# Patient Record
Sex: MALE | Race: WHITE | Employment: FULL TIME | URBAN - METROPOLITAN AREA
[De-identification: names, ages, dates, MRNs, and addresses within clinical notes are randomized per-mention and may not be internally consistent; named-entity substitution may affect disease eponyms.]

---

## 2024-10-25 ENCOUNTER — OFFICE VISIT (OUTPATIENT)
Age: 34
End: 2024-10-25
Payer: COMMERCIAL

## 2024-10-25 VITALS
DIASTOLIC BLOOD PRESSURE: 88 MMHG | HEART RATE: 76 BPM | WEIGHT: 225 LBS | HEIGHT: 72 IN | BODY MASS INDEX: 30.48 KG/M2 | OXYGEN SATURATION: 98 % | SYSTOLIC BLOOD PRESSURE: 136 MMHG | TEMPERATURE: 98.3 F

## 2024-10-25 DIAGNOSIS — R06.83 SNORING: ICD-10-CM

## 2024-10-25 DIAGNOSIS — K21.9 GASTROESOPHAGEAL REFLUX DISEASE, UNSPECIFIED WHETHER ESOPHAGITIS PRESENT: ICD-10-CM

## 2024-10-25 DIAGNOSIS — R13.14 PHARYNGOESOPHAGEAL DYSPHAGIA: Primary | ICD-10-CM

## 2024-10-25 DIAGNOSIS — G47.10 HYPERSOMNOLENCE: ICD-10-CM

## 2024-10-25 PROCEDURE — 99214 OFFICE O/P EST MOD 30 MIN: CPT | Performed by: FAMILY MEDICINE

## 2024-10-25 RX ORDER — PANTOPRAZOLE SODIUM 40 MG/1
40 TABLET, DELAYED RELEASE ORAL 2 TIMES DAILY
Qty: 60 TABLET | Refills: 1 | Status: SHIPPED | OUTPATIENT
Start: 2024-10-25

## 2024-10-25 SDOH — ECONOMIC STABILITY: FOOD INSECURITY: WITHIN THE PAST 12 MONTHS, THE FOOD YOU BOUGHT JUST DIDN'T LAST AND YOU DIDN'T HAVE MONEY TO GET MORE.: NEVER TRUE

## 2024-10-25 SDOH — ECONOMIC STABILITY: INCOME INSECURITY: HOW HARD IS IT FOR YOU TO PAY FOR THE VERY BASICS LIKE FOOD, HOUSING, MEDICAL CARE, AND HEATING?: NOT HARD AT ALL

## 2024-10-25 SDOH — ECONOMIC STABILITY: FOOD INSECURITY: WITHIN THE PAST 12 MONTHS, YOU WORRIED THAT YOUR FOOD WOULD RUN OUT BEFORE YOU GOT MONEY TO BUY MORE.: NEVER TRUE

## 2024-10-25 ASSESSMENT — PATIENT HEALTH QUESTIONNAIRE - PHQ9
SUM OF ALL RESPONSES TO PHQ QUESTIONS 1-9: 0
2. FEELING DOWN, DEPRESSED OR HOPELESS: NOT AT ALL
SUM OF ALL RESPONSES TO PHQ QUESTIONS 1-9: 0
SUM OF ALL RESPONSES TO PHQ9 QUESTIONS 1 & 2: 0
SUM OF ALL RESPONSES TO PHQ QUESTIONS 1-9: 0
1. LITTLE INTEREST OR PLEASURE IN DOING THINGS: NOT AT ALL
SUM OF ALL RESPONSES TO PHQ QUESTIONS 1-9: 0

## 2024-10-25 NOTE — PROGRESS NOTES
[facility]  [facility] MEDICINE  [address]  [place] [address]     Date of Visit:  10/25/2024  Patient Name: Nilesh Lorenzo   Patient :  1990     CHIEF COMPLAINT/HPI:     Chief Complaint   Patient presents with    Gastroesophageal Reflux     Patient is having some stomach issues, he states he's having some acid reflex and bloating and says it has been going on for about 2 weeks         HPI      Nilesh Lorenzo, 34 y.o. presents today for acute issue.    He has been having pain in esophagus from his throat down to his stomach.  He was previously on omeprazole.  He ran out of prescription and has been using OTC generic for the last 3-4 weeks.  Despite this, he has been feeling the acid come up and burning his throat after he eats.  He has done CT scan in the past.  Bowel movements have been fine.  He has never had an EGD.  He does drink coffee in the morning, 24 ounces in the morning.  He is drinking about 40 ounces of soda in the evening.  Occasionally drinks beer on the weekend.  Sometimes it is hard to swallow, like pancakes or bread.      He has been under a lot of stress lately.  His brother  3-4 weeks ago.      His wife is complaining that he is snoring loudly at nighttime.  He is waking up with tension headaches and not feeling rested.      REVIEW OF SYSTEM      Review of Systems:   Constitutional:  Negative for chills, fatigue, fever and unexpected weight change.   Eyes:  Negative for visual disturbance.   Respiratory:  Negative for cough, chest tightness, shortness of breath and wheezing.    Cardiovascular:  Negative for chest pain, palpitations and leg swelling.     REVIEWED INFORMATION      No Known Allergies    Current Outpatient Medications   Medication Sig Dispense Refill    pantoprazole (PROTONIX) 40 MG tablet Take 1 tablet by mouth in the morning and at bedtime 60 tablet 1     No current facility-administered medications for this visit.        There is no

## 2024-10-25 NOTE — PATIENT INSTRUCTIONS
Nilesh    Thank you for choosing [facility].  We know you have options when it comes to your healthcare; we appreciate that you chose us. Our goal is to provide exceptional service and world class care to every patient.  You will be receiving a survey via email or text message asking for your feedback.  Please take a few minutes to share your thoughts about your recent visit. Your comments helps us understand what we do well and ways we can improve.    We thank you in advance for your valuable feedback      Dr. Rowland  and ZURI Mora

## 2025-01-03 DIAGNOSIS — R13.14 PHARYNGOESOPHAGEAL DYSPHAGIA: ICD-10-CM

## 2025-01-07 ENCOUNTER — OFFICE VISIT (OUTPATIENT)
Age: 35
End: 2025-01-07
Payer: COMMERCIAL

## 2025-01-07 VITALS
BODY MASS INDEX: 30.38 KG/M2 | DIASTOLIC BLOOD PRESSURE: 84 MMHG | SYSTOLIC BLOOD PRESSURE: 132 MMHG | OXYGEN SATURATION: 97 % | TEMPERATURE: 98.7 F | HEART RATE: 77 BPM | WEIGHT: 224 LBS

## 2025-01-07 DIAGNOSIS — G47.10 HYPERSOMNOLENCE: ICD-10-CM

## 2025-01-07 DIAGNOSIS — K21.9 GASTROESOPHAGEAL REFLUX DISEASE, UNSPECIFIED WHETHER ESOPHAGITIS PRESENT: Primary | ICD-10-CM

## 2025-01-07 DIAGNOSIS — R13.14 PHARYNGOESOPHAGEAL DYSPHAGIA: ICD-10-CM

## 2025-01-07 DIAGNOSIS — R06.83 SNORING: ICD-10-CM

## 2025-01-07 PROCEDURE — 99214 OFFICE O/P EST MOD 30 MIN: CPT | Performed by: FAMILY MEDICINE

## 2025-01-07 RX ORDER — PANTOPRAZOLE SODIUM 40 MG/1
40 TABLET, DELAYED RELEASE ORAL 2 TIMES DAILY
Qty: 60 TABLET | Refills: 1 | Status: SHIPPED | OUTPATIENT
Start: 2025-01-07

## 2025-01-07 ASSESSMENT — PATIENT HEALTH QUESTIONNAIRE - PHQ9
SUM OF ALL RESPONSES TO PHQ QUESTIONS 1-9: 0
SUM OF ALL RESPONSES TO PHQ QUESTIONS 1-9: 0
2. FEELING DOWN, DEPRESSED OR HOPELESS: NOT AT ALL
SUM OF ALL RESPONSES TO PHQ QUESTIONS 1-9: 0
SUM OF ALL RESPONSES TO PHQ QUESTIONS 1-9: 0
SUM OF ALL RESPONSES TO PHQ9 QUESTIONS 1 & 2: 0
1. LITTLE INTEREST OR PLEASURE IN DOING THINGS: NOT AT ALL

## 2025-01-07 NOTE — PROGRESS NOTES
Nilesh Lorenzo (:  1990) is a 34 y.o. male, Established patient, here for evaluation of the following chief complaint(s):  Abnormal Test Results (Had EGD on 24 with Dr. Cash, awaiting biopsy results.  Needs sleep study and another EGD done in the hospital, per Dr. Cash. (Sleep apnea under anesthesia))         Assessment & Plan  1. Suspicion for Godinez's esophagus.  The diagnosis of Godinez's esophagus is based on the observed redness and other visual indicators, pending biopsy confirmation. The sore throat is likely a result of acid reflux. Medications such as Protonix can help halt or slow its progression. The potential long-term risks associated with Protonix, including an increased risk of kidney disease and dementia, have been discussed. However, given his condition, the benefits of the medication outweigh these risks. He has been advised to maintain regular follow-ups with Dr. Cash or his associates to ensure timely polyp scopes and other necessary procedures. He has been reassured that he can live with Godinez's esophagus, provided it does not progress to dysplasia or carcinoma. He is currently on the highest dose of Protonix, which will eventually be reduced to 40 mg once daily. He has been advised to continue his lifestyle modifications, including dietary changes and quitting chewing tobacco. He has been encouraged to consume small, frequent meals throughout the day to avoid nighttime reflux. He has been advised to follow a low-citrus diet for a while and then gradually reintroduce other foods to assess their impact.    2. Hiatal hernia.  The hiatal hernia measures 4 cm, indicating that a portion of the stomach has protruded through the diaphragm. He has been informed that surgical intervention is typically reserved for large hernias that cause significant symptoms such as shortness of breath. He has been advised to monitor his symptoms and report any changes.    3. Gastroesophageal

## 2025-01-17 ENCOUNTER — HOSPITAL ENCOUNTER (OUTPATIENT)
Dept: SLEEP CENTER | Age: 35
Discharge: HOME | End: 2025-01-19
Attending: FAMILY MEDICINE
Payer: COMMERCIAL

## 2025-01-17 VITALS
WEIGHT: 224 LBS | HEIGHT: 72 IN | RESPIRATION RATE: 18 BRPM | OXYGEN SATURATION: 96 % | HEART RATE: 69 BPM | BODY MASS INDEX: 30.34 KG/M2

## 2025-01-17 DIAGNOSIS — G47.10 HYPERSOMNOLENCE: ICD-10-CM

## 2025-01-17 DIAGNOSIS — R06.83 SNORING: ICD-10-CM

## 2025-01-17 PROCEDURE — 95810 POLYSOM 6/> YRS 4/> PARAM: CPT

## 2025-01-17 ASSESSMENT — SLEEP AND FATIGUE QUESTIONNAIRES
HOW LIKELY ARE YOU TO NOD OFF OR FALL ASLEEP WHILE SITTING QUIETLY AFTER LUNCH WITHOUT ALCOHOL: WOULD NEVER DOZE
HOW LIKELY ARE YOU TO NOD OFF OR FALL ASLEEP WHILE LYING DOWN TO REST IN THE AFTERNOON WHEN CIRCUMSTANCES PERMIT: HIGH CHANCE OF DOZING
HOW LIKELY ARE YOU TO NOD OFF OR FALL ASLEEP WHILE SITTING INACTIVE IN A PUBLIC PLACE: WOULD NEVER DOZE
ESS TOTAL SCORE: 7
HOW LIKELY ARE YOU TO NOD OFF OR FALL ASLEEP WHEN YOU ARE A PASSENGER IN A CAR FOR AN HOUR WITHOUT A BREAK: SLIGHT CHANCE OF DOZING
HOW LIKELY ARE YOU TO NOD OFF OR FALL ASLEEP WHILE WATCHING TV: HIGH CHANCE OF DOZING
HOW LIKELY ARE YOU TO NOD OFF OR FALL ASLEEP WHILE SITTING AND TALKING TO SOMEONE: WOULD NEVER DOZE
HOW LIKELY ARE YOU TO NOD OFF OR FALL ASLEEP WHILE SITTING AND READING: WOULD NEVER DOZE
HOW LIKELY ARE YOU TO NOD OFF OR FALL ASLEEP IN A CAR, WHILE STOPPED FOR A FEW MINUTES IN TRAFFIC: WOULD NEVER DOZE

## 2025-02-14 DIAGNOSIS — R06.83 SNORING: Primary | ICD-10-CM

## 2025-02-14 DIAGNOSIS — G47.10 HYPERSOMNOLENCE: ICD-10-CM

## 2025-02-19 ENCOUNTER — ANESTHESIA (OUTPATIENT)
Dept: OPERATING ROOM | Age: 35
End: 2025-02-19
Payer: COMMERCIAL

## 2025-02-19 ENCOUNTER — ANESTHESIA EVENT (OUTPATIENT)
Dept: OPERATING ROOM | Age: 35
End: 2025-02-19
Payer: COMMERCIAL

## 2025-02-19 ENCOUNTER — HOSPITAL ENCOUNTER (OUTPATIENT)
Age: 35
Setting detail: OUTPATIENT SURGERY
Discharge: HOME | End: 2025-02-19
Attending: INTERNAL MEDICINE | Admitting: INTERNAL MEDICINE
Payer: COMMERCIAL

## 2025-02-19 VITALS
RESPIRATION RATE: 12 BRPM | OXYGEN SATURATION: 100 % | TEMPERATURE: 96.8 F | HEART RATE: 56 BPM | BODY MASS INDEX: 29.66 KG/M2 | HEIGHT: 72 IN | WEIGHT: 219 LBS | DIASTOLIC BLOOD PRESSURE: 82 MMHG | SYSTOLIC BLOOD PRESSURE: 125 MMHG

## 2025-02-19 DIAGNOSIS — R13.10 DYSPHAGIA, UNSPECIFIED TYPE: ICD-10-CM

## 2025-02-19 PROCEDURE — 88305 TISSUE EXAM BY PATHOLOGIST: CPT

## 2025-02-19 PROCEDURE — 3609012700 HC EGD DILATION SAVORY: Performed by: INTERNAL MEDICINE

## 2025-02-19 PROCEDURE — 2580000003 HC RX 258: Performed by: ANESTHESIOLOGY

## 2025-02-19 PROCEDURE — 3700000001 HC ADD 15 MINUTES (ANESTHESIA): Performed by: INTERNAL MEDICINE

## 2025-02-19 PROCEDURE — 7100000011 HC PHASE II RECOVERY - ADDTL 15 MIN: Performed by: INTERNAL MEDICINE

## 2025-02-19 PROCEDURE — 2709999900 HC NON-CHARGEABLE SUPPLY: Performed by: INTERNAL MEDICINE

## 2025-02-19 PROCEDURE — 3609012400 HC EGD TRANSORAL BIOPSY SINGLE/MULTIPLE: Performed by: INTERNAL MEDICINE

## 2025-02-19 PROCEDURE — C1769 GUIDE WIRE: HCPCS | Performed by: INTERNAL MEDICINE

## 2025-02-19 PROCEDURE — 2580000003 HC RX 258: Performed by: NURSE ANESTHETIST, CERTIFIED REGISTERED

## 2025-02-19 PROCEDURE — 6360000002 HC RX W HCPCS: Performed by: NURSE ANESTHETIST, CERTIFIED REGISTERED

## 2025-02-19 PROCEDURE — 3700000000 HC ANESTHESIA ATTENDED CARE: Performed by: INTERNAL MEDICINE

## 2025-02-19 PROCEDURE — 7100000010 HC PHASE II RECOVERY - FIRST 15 MIN: Performed by: INTERNAL MEDICINE

## 2025-02-19 RX ORDER — PROCHLORPERAZINE EDISYLATE 5 MG/ML
5 INJECTION INTRAMUSCULAR; INTRAVENOUS
Status: DISCONTINUED | OUTPATIENT
Start: 2025-02-19 | End: 2025-02-19 | Stop reason: HOSPADM

## 2025-02-19 RX ORDER — SODIUM CHLORIDE 0.9 % (FLUSH) 0.9 %
5-40 SYRINGE (ML) INJECTION PRN
Status: DISCONTINUED | OUTPATIENT
Start: 2025-02-19 | End: 2025-02-19 | Stop reason: HOSPADM

## 2025-02-19 RX ORDER — DIPHENHYDRAMINE HYDROCHLORIDE 50 MG/ML
12.5 INJECTION INTRAMUSCULAR; INTRAVENOUS
Status: DISCONTINUED | OUTPATIENT
Start: 2025-02-19 | End: 2025-02-19 | Stop reason: HOSPADM

## 2025-02-19 RX ORDER — LIDOCAINE HYDROCHLORIDE 10 MG/ML
1 INJECTION, SOLUTION EPIDURAL; INFILTRATION; INTRACAUDAL; PERINEURAL
Status: DISCONTINUED | OUTPATIENT
Start: 2025-02-19 | End: 2025-02-19 | Stop reason: HOSPADM

## 2025-02-19 RX ORDER — SODIUM CHLORIDE, SODIUM LACTATE, POTASSIUM CHLORIDE, CALCIUM CHLORIDE 600; 310; 30; 20 MG/100ML; MG/100ML; MG/100ML; MG/100ML
INJECTION, SOLUTION INTRAVENOUS CONTINUOUS
Status: DISCONTINUED | OUTPATIENT
Start: 2025-02-19 | End: 2025-02-19 | Stop reason: HOSPADM

## 2025-02-19 RX ORDER — SODIUM CHLORIDE, SODIUM LACTATE, POTASSIUM CHLORIDE, CALCIUM CHLORIDE 600; 310; 30; 20 MG/100ML; MG/100ML; MG/100ML; MG/100ML
INJECTION, SOLUTION INTRAVENOUS
Status: DISCONTINUED | OUTPATIENT
Start: 2025-02-19 | End: 2025-02-19 | Stop reason: SDUPTHER

## 2025-02-19 RX ORDER — LIDOCAINE HYDROCHLORIDE 20 MG/ML
INJECTION, SOLUTION EPIDURAL; INFILTRATION; INTRACAUDAL; PERINEURAL
Status: DISCONTINUED | OUTPATIENT
Start: 2025-02-19 | End: 2025-02-19 | Stop reason: SDUPTHER

## 2025-02-19 RX ORDER — SODIUM CHLORIDE 9 MG/ML
INJECTION, SOLUTION INTRAVENOUS CONTINUOUS
Status: DISCONTINUED | OUTPATIENT
Start: 2025-02-19 | End: 2025-02-19 | Stop reason: HOSPADM

## 2025-02-19 RX ORDER — PROPOFOL 10 MG/ML
INJECTION, EMULSION INTRAVENOUS
Status: DISCONTINUED | OUTPATIENT
Start: 2025-02-19 | End: 2025-02-19 | Stop reason: SDUPTHER

## 2025-02-19 RX ORDER — SODIUM CHLORIDE 0.9 % (FLUSH) 0.9 %
5-40 SYRINGE (ML) INJECTION EVERY 12 HOURS SCHEDULED
Status: DISCONTINUED | OUTPATIENT
Start: 2025-02-19 | End: 2025-02-19 | Stop reason: HOSPADM

## 2025-02-19 RX ORDER — SODIUM CHLORIDE 9 MG/ML
INJECTION, SOLUTION INTRAVENOUS PRN
Status: DISCONTINUED | OUTPATIENT
Start: 2025-02-19 | End: 2025-02-19 | Stop reason: HOSPADM

## 2025-02-19 RX ORDER — ONDANSETRON 2 MG/ML
4 INJECTION INTRAMUSCULAR; INTRAVENOUS
Status: DISCONTINUED | OUTPATIENT
Start: 2025-02-19 | End: 2025-02-19 | Stop reason: HOSPADM

## 2025-02-19 RX ORDER — FENTANYL CITRATE 50 UG/ML
25 INJECTION, SOLUTION INTRAMUSCULAR; INTRAVENOUS EVERY 5 MIN PRN
Status: DISCONTINUED | OUTPATIENT
Start: 2025-02-19 | End: 2025-02-19 | Stop reason: HOSPADM

## 2025-02-19 RX ADMIN — PROPOFOL 50 MG: 10 INJECTION, EMULSION INTRAVENOUS at 10:06

## 2025-02-19 RX ADMIN — PROPOFOL 50 MG: 10 INJECTION, EMULSION INTRAVENOUS at 10:05

## 2025-02-19 RX ADMIN — PROPOFOL 50 MG: 10 INJECTION, EMULSION INTRAVENOUS at 10:04

## 2025-02-19 RX ADMIN — SODIUM CHLORIDE, POTASSIUM CHLORIDE, SODIUM LACTATE AND CALCIUM CHLORIDE: 600; 310; 30; 20 INJECTION, SOLUTION INTRAVENOUS at 09:00

## 2025-02-19 RX ADMIN — LIDOCAINE HYDROCHLORIDE 60 MG: 20 INJECTION, SOLUTION EPIDURAL; INFILTRATION; INTRACAUDAL; PERINEURAL at 10:03

## 2025-02-19 RX ADMIN — PROPOFOL 50 MG: 10 INJECTION, EMULSION INTRAVENOUS at 10:10

## 2025-02-19 RX ADMIN — SODIUM CHLORIDE, POTASSIUM CHLORIDE, SODIUM LACTATE AND CALCIUM CHLORIDE: 600; 310; 30; 20 INJECTION, SOLUTION INTRAVENOUS at 09:59

## 2025-02-19 RX ADMIN — PROPOFOL 100 MG: 10 INJECTION, EMULSION INTRAVENOUS at 10:03

## 2025-02-19 RX ADMIN — PROPOFOL 50 MG: 10 INJECTION, EMULSION INTRAVENOUS at 10:08

## 2025-02-19 ASSESSMENT — ENCOUNTER SYMPTOMS
SHORTNESS OF BREATH: 0
COLOR CHANGE: 0
SINUS PAIN: 0
SINUS PRESSURE: 0
CONSTIPATION: 0
CHEST TIGHTNESS: 0
COUGH: 0
NAUSEA: 0
DIARRHEA: 0
VOMITING: 0
BLOOD IN STOOL: 0
ABDOMINAL PAIN: 1
WHEEZING: 0
SHORTNESS OF BREATH: 0
SORE THROAT: 0

## 2025-02-19 ASSESSMENT — PAIN SCALES - GENERAL: PAINLEVEL_OUTOF10: 0

## 2025-02-19 NOTE — OP NOTE
EGD NOTE      Patient:   Nilesh Lorenzo    :    1990    Facility:   [facility]  Referring/PCP: Aura Rowland MD    Procedure:   Esophagogastroduodenoscopy with biopsy, esophageal dilation  Date:     2025   Endoscopist:  Jevon Cotter D.O.  Assistant:                  None    Preoperative Diagnosis:     Islas's  Dysphagia  Esophageal stricture    Postoperative Diagnosis:    Hiatal hernia  Islas's  Esophageal stenosis    Anesthesia:  MAC    Complications: None    Description of Procedure:  Informed consent was obtained after explanation of the procedure including indications, description of the procedure,  benefits and possible risks and complications of the procedure, and alternatives. Questions were answered.  The patient's history was reviewed and a directed physical examination was performed prior to the procedure.    Patient was monitored throughout the procedure with pulse oximetry and periodic assessment of vital signs. Patient was sedated as noted above. With the patient in the left lateral decubitus position, the Olympus videoendoscope was placed in the patient's mouth and under direct visualization passed into the esophagus.  Visualization of the esophagus, stomach, and duodenum was performed during both introduction and withdrawal of the endoscope and retroflexed view of the proximal stomach was obtained. The scope was passed to the 2nd portion of the duodenum.  The patient tolerated the procedure well and was taken to the recovery area in good condition.    EBL: none  Specimen:  Proximal esophagus and islas's biopsies.  Implants: None      Findings::   Esophagus: 3 cm hiatal hernia.  4 cm segment of islas's.  Mild short ring like stenosis at GE jxn.  18 mm savary dilation performed with moderate resistance.  Subtle ringed esophagus.   Stomach: normal  Duodenum: normal    Recommendations:   -Await pathology.  -F/U with PCP as planned.      Jevon Cotter D.O.

## 2025-02-19 NOTE — ANESTHESIA POSTPROCEDURE EVALUATION
Department of Anesthesiology  Postprocedure Note    Patient: Nilesh Lorenzo  MRN: [id number]  YOB: 1990  Date of evaluation: 2/19/2025    Procedure Summary       Date: 02/19/25 Room / Location: [address]    Anesthesia Start: 0959 Anesthesia Stop: 1019    Procedure: ESOPHAGOGASTRODUODENOSCOPY BIOPSY WITH DILATION Diagnosis:       Dysphagia, unspecified type      (Dysphagia, unspecified type [R13.10])    Surgeons: Jevon Cotter MD Responsible Provider: Jay Rosas MD    Anesthesia Type: General, MAC ASA Status: 2            Anesthesia Type: General, MAC    Bashir Phase I: Bashir Score: 10    Bashir Phase II: Bashir Score: 10    Anesthesia Post Evaluation    Airway patency: patent  Cardiovascular status: hemodynamically stable  Respiratory status: acceptable    No notable events documented.

## 2025-02-19 NOTE — DISCHARGE INSTRUCTIONS
Upper GI Endoscopy: What to Expect at Home  Your Recovery  After you have an endoscopy, you will stay at the hospital or clinic for 1 to 2 hours. This will allow the medicine to wear off. You will be able to go home after your doctor or nurse checks to make sure you are not having any problems.  You may have a sore throat for a day or two after the test.  This care sheet gives you a general idea about what to expect after the test.  How can you care for yourself at home?  Activity  Rest as much as you need to after you go home.  You should be able to go back to your usual activities the day after the test.  Diet  Follow your doctor's directions for eating after the test.  Drink plenty of fluids (unless your doctor has told you not to).  Follow-up care is a key part of your treatment and safety. Be sure to make and go to all appointments, and call your doctor if you are having problems. It's also a good idea to know your test results and keep a list of the medicines you take.  When should you call for help?  Call 911 anytime you think you may need emergency care. For example, call if:  You passed out (lost consciousness).  You cough up blood.  You vomit blood or what looks like coffee grounds.  You pass maroon or very bloody stools.  Call your doctor now or seek immediate medical care if:  You have trouble swallowing.  You have belly pain.  Your stools are black and tarlike or have streaks of blood.  You are sick to your stomach or cannot keep fluids down.  Watch closely for changes in your health, and be sure to contact your doctor if:  Your throat still hurts after a day or two.  You do not get better as expected.   Where can you learn more?   Go to [email or web address] and sign in to your [email or web address] account. Enter J454 in the Search Health Information box to learn more about “Upper GI Endoscopy: What to Expect at Home.”    If you do not have an account, please click on the “Sign Up Now” link.

## 2025-02-19 NOTE — ANESTHESIA PRE PROCEDURE
Department of Anesthesiology  Preprocedure Note       Name:  Nilesh Lorenzo   Age:  34 y.o.  :  1990                                          MRN:  [id number]         Date:  2025      Surgeon: Surgeon(s):  Jevon Cotter MD    Procedure: Procedure(s):  ESOPHAGOGASTRODUODENOSCOPY    Medications prior to admission:   Prior to Admission medications    Medication Sig Start Date End Date Taking? Authorizing Provider   pantoprazole (PROTONIX) 40 MG tablet Take 1 tablet by mouth in the morning and at bedtime 25   Aura Rowland MD       Current medications:    Current Facility-Administered Medications   Medication Dose Route Frequency Provider Last Rate Last Admin    lidocaine PF 1 % injection 1 mL  1 mL IntraDERmal Once PRN Eliazar Perez MD        0.9 % sodium chloride infusion   IntraVENous Continuous Eliazar Perez MD        lactated ringers infusion   IntraVENous Continuous Eliazar Perez MD        sodium chloride flush 0.9 % injection 5-40 mL  5-40 mL IntraVENous 2 times per day Eliazar Perez MD        sodium chloride flush 0.9 % injection 5-40 mL  5-40 mL IntraVENous PRN Eliazar Perez MD        0.9 % sodium chloride infusion   IntraVENous PRN Eliazar Perez MD           Allergies:  No Known Allergies    Problem List:  There is no problem list on file for this patient.      Past Medical History:        Diagnosis Date    Carotid artery stenosis        Past Surgical History:  No past surgical history on file.    Social History:    Social History     Tobacco Use    Smoking status: Former     Current packs/day: 0.00     Average packs/day: 0.5 packs/day for 4.0 years (2.0 ttl pk-yrs)     Types: Cigarettes     Start date:      Quit date: 2012     Years since quittin.1    Smokeless tobacco: Current     Types: Chew   Substance Use Topics    Alcohol use: Not on file                                Ready to quit: Not Answered  Counseling given: Not Answered      Vital Signs (Current):   Vitals:

## 2025-02-20 LAB — SURGICAL PATHOLOGY REPORT: NORMAL

## 2025-02-28 ENCOUNTER — HOSPITAL ENCOUNTER (OUTPATIENT)
Dept: SLEEP CENTER | Age: 35
Discharge: HOME | End: 2025-02-28
Attending: FAMILY MEDICINE
Payer: COMMERCIAL

## 2025-02-28 DIAGNOSIS — R06.83 SNORING: ICD-10-CM

## 2025-02-28 DIAGNOSIS — G47.10 HYPERSOMNOLENCE: ICD-10-CM

## 2025-02-28 PROCEDURE — 95811 POLYSOM 6/>YRS CPAP 4/> PARM: CPT

## 2025-03-13 ENCOUNTER — RESULTS FOLLOW-UP (OUTPATIENT)
Dept: SLEEP CENTER | Age: 35
End: 2025-03-13

## 2025-03-13 DIAGNOSIS — G47.33 OBSTRUCTIVE SLEEP APNEA HYPOPNEA, MILD: Primary | ICD-10-CM

## 2025-04-02 DIAGNOSIS — R13.14 PHARYNGOESOPHAGEAL DYSPHAGIA: ICD-10-CM

## 2025-04-02 RX ORDER — PANTOPRAZOLE SODIUM 40 MG/1
40 TABLET, DELAYED RELEASE ORAL 2 TIMES DAILY
Qty: 60 TABLET | Refills: 2 | Status: SHIPPED | OUTPATIENT
Start: 2025-04-02

## 2025-04-02 NOTE — TELEPHONE ENCOUNTER
Nilesh Lorenzo is calling to request a refill on the following medication(s):    Medication Request:  Requested Prescriptions     Pending Prescriptions Disp Refills    pantoprazole (PROTONIX) 40 MG tablet 60 tablet 2     Sig: Take 1 tablet by mouth in the morning and at bedtime       Last Visit Date (If Applicable):  1/7/2025    Next Visit Date:    4/11/2025

## 2025-04-11 ENCOUNTER — OFFICE VISIT (OUTPATIENT)
Age: 35
End: 2025-04-11
Payer: COMMERCIAL

## 2025-04-11 VITALS
SYSTOLIC BLOOD PRESSURE: 130 MMHG | HEART RATE: 74 BPM | DIASTOLIC BLOOD PRESSURE: 88 MMHG | RESPIRATION RATE: 14 BRPM | OXYGEN SATURATION: 97 % | BODY MASS INDEX: 30.75 KG/M2 | HEIGHT: 72 IN | WEIGHT: 227 LBS

## 2025-04-11 DIAGNOSIS — G47.33 OBSTRUCTIVE SLEEP APNEA: ICD-10-CM

## 2025-04-11 DIAGNOSIS — K20.90 BARRETT ESOPHAGUS WITH ESOPHAGITIS: Primary | ICD-10-CM

## 2025-04-11 DIAGNOSIS — K22.70 BARRETT ESOPHAGUS WITH ESOPHAGITIS: Primary | ICD-10-CM

## 2025-04-11 PROCEDURE — 99214 OFFICE O/P EST MOD 30 MIN: CPT | Performed by: FAMILY MEDICINE

## 2025-04-11 SDOH — ECONOMIC STABILITY: FOOD INSECURITY: WITHIN THE PAST 12 MONTHS, THE FOOD YOU BOUGHT JUST DIDN'T LAST AND YOU DIDN'T HAVE MONEY TO GET MORE.: NEVER TRUE

## 2025-04-11 SDOH — ECONOMIC STABILITY: FOOD INSECURITY: WITHIN THE PAST 12 MONTHS, YOU WORRIED THAT YOUR FOOD WOULD RUN OUT BEFORE YOU GOT MONEY TO BUY MORE.: NEVER TRUE

## 2025-04-11 NOTE — PROGRESS NOTES
Nilesh Lorenzo (:  1990) is a 34 y.o. male, Established patient, here for evaluation of the following chief complaint(s):  Sleep Problem (Had sleep study) and Discuss Medications         Assessment & Plan  1. Sleep Apnea.  Reports that the CPAP is working well, and he feels more awake and rested. He has been using it for 16 days without significant issues.  Physical exam findings and test results indicate effective use of CPAP with no complications.  Advised to continue using the CPAP and follow up with a sleep medicine specialist annually for proper management and insurance compliance.  Referral to the sleep medicine specialist is valid for a year.    2. Acid Reflux.  No acid reflux experienced this year, attributed to cutting out caffeine and reducing intake of chocolate and alcohol.  Physical exam findings show no heartburn or stomach discomfort.  Discussed the benefits of avoiding caffeine and spicy foods, and recommended the use of an elevated pillow to manage residual symptoms.  Encouraged to maintain a healthy diet rich in green leafy vegetables and whole foods to prevent acid reflux and promote overall health.    3. Allergies.  Experiences seasonal allergies, particularly in the spring.  Physical exam findings suggest chest pressure may be related to allergies.  Discussed the possibility of allergies contributing to chest pressure and advised to take Claritin as needed.  Reassured that chest pressure could be a normal response to pollen.    Follow-up  Follow-up scheduled for 2025 for a well visit.    Results    1. Godinez esophagus with esophagitis  2. Obstructive sleep apnea    Return in about 7 months (around 2025) for well visit.       Subjective   History of Present Illness  The patient presents for evaluation of sleep apnea, acid reflux, and allergies.    A positive response to CPAP therapy is reported, which has been used for 16 days. Contact was made by a sleep specialist, but further

## 2025-07-25 ENCOUNTER — TELEPHONE (OUTPATIENT)
Age: 35
End: 2025-07-25

## 2025-07-25 DIAGNOSIS — R13.14 PHARYNGOESOPHAGEAL DYSPHAGIA: ICD-10-CM

## 2025-07-25 RX ORDER — PANTOPRAZOLE SODIUM 40 MG/1
40 TABLET, DELAYED RELEASE ORAL 2 TIMES DAILY
Qty: 60 TABLET | Refills: 2 | Status: SHIPPED | OUTPATIENT
Start: 2025-07-25

## 2025-07-25 NOTE — TELEPHONE ENCOUNTER
Nilesh Lorenzo is calling to request a refill on the following medication(s):    Medication Request:  Requested Prescriptions     Pending Prescriptions Disp Refills    pantoprazole (PROTONIX) 40 MG tablet 60 tablet 2     Sig: Take 1 tablet by mouth in the morning and at bedtime       Last Visit Date (If Applicable):  4/11/2025    Next Visit Date:    11/14/2025

## (undated) DEVICE — YANKAUER,FLEXIBLE HANDLE,REGLR CAPACITY: Brand: MEDLINE INDUSTRIES, INC.

## (undated) DEVICE — GUIDEWIRE WITH SPRING TIP - SINGLE USE: Brand: SAFEGUIDE®

## (undated) DEVICE — FORCEP BX MESH TOOTH MIC 2.8 MMX240 CM NDL STRL RADIAL JAW 4

## (undated) DEVICE — BITE BLOCK ENDOSCP AD 60 FR W/ ADJ STRP PLAS GRN BLOX

## (undated) DEVICE — STAZ ENDO KIT: Brand: MEDLINE INDUSTRIES, INC.